# Patient Record
Sex: FEMALE | Race: ASIAN
[De-identification: names, ages, dates, MRNs, and addresses within clinical notes are randomized per-mention and may not be internally consistent; named-entity substitution may affect disease eponyms.]

---

## 2018-12-02 ENCOUNTER — HOSPITAL ENCOUNTER (EMERGENCY)
Dept: HOSPITAL 26 - MED | Age: 23
Discharge: HOME | End: 2018-12-02
Payer: COMMERCIAL

## 2018-12-02 VITALS
DIASTOLIC BLOOD PRESSURE: 62 MMHG | BODY MASS INDEX: 19.29 KG/M2 | SYSTOLIC BLOOD PRESSURE: 108 MMHG | HEIGHT: 66 IN | WEIGHT: 120 LBS

## 2018-12-02 VITALS — SYSTOLIC BLOOD PRESSURE: 102 MMHG | DIASTOLIC BLOOD PRESSURE: 51 MMHG

## 2018-12-02 DIAGNOSIS — F10.129: Primary | ICD-10-CM

## 2018-12-02 DIAGNOSIS — R11.2: ICD-10-CM

## 2018-12-02 PROCEDURE — 96361 HYDRATE IV INFUSION ADD-ON: CPT

## 2018-12-02 PROCEDURE — 99283 EMERGENCY DEPT VISIT LOW MDM: CPT

## 2018-12-02 PROCEDURE — 96374 THER/PROPH/DIAG INJ IV PUSH: CPT

## 2018-12-02 NOTE — NUR
-------------------------------------------------------------------------------

            *** Note undone in [facility] - 12/02/18 at 0540 by JAKE ***            

-------------------------------------------------------------------------------

Dr. Rojas evaluating patient at bedside.

## 2018-12-02 NOTE — NUR
Patient discharged with v/s stable. Written and verbal after care instructions 
given and explained. Patient alert, oriented and verbalized understanding of 
instructions. Ambulatory with to car. All questions addressed prior to 
discharge. ID band removed. Patient advised to follow up with PMD. Rx of ZOFRAN 
given. Patient educated on indication of medication including possible reaction 
and side effects. PT STATED SHE WOULD GET AN UBER HOME. Opportunity to ask 
questions provided and answered.

## 2018-12-02 NOTE — NUR
PATIENT BIBA TO ED FOR ETOH. PT FRIEND STATED SHE HAD 10 DRINKS, PT STATES SHE 
IS COLD, PT FEELS COLD TO TOUCH. PT IS LETHARGIC, OPENS EYES TO VOICE, UNABLE 
TO AMBULATE AT THIS TIME. N/V PRESENT; VSS; PATIENT POSITIONED FOR COMFORT; HOB 
ELEVATED; BEDRAILS UP X2; BED DOWN. ER MD MADE AWARE OF PT STATUS.